# Patient Record
Sex: FEMALE | Race: WHITE | URBAN - METROPOLITAN AREA
[De-identification: names, ages, dates, MRNs, and addresses within clinical notes are randomized per-mention and may not be internally consistent; named-entity substitution may affect disease eponyms.]

---

## 2018-10-02 ENCOUNTER — HOSPITAL ENCOUNTER (OUTPATIENT)
Dept: MAMMOGRAPHY | Age: 38
Discharge: HOME | End: 2018-10-02
Attending: NURSE PRACTITIONER
Payer: COMMERCIAL

## 2018-10-02 DIAGNOSIS — Z12.31 VISIT FOR SCREENING MAMMOGRAM: ICD-10-CM

## 2018-10-02 PROCEDURE — 77067 SCR MAMMO BI INCL CAD: CPT

## 2019-03-12 ENCOUNTER — APPOINTMENT (RX ONLY)
Dept: URBAN - METROPOLITAN AREA CLINIC 349 | Facility: CLINIC | Age: 39
Setting detail: DERMATOLOGY
End: 2019-03-12

## 2019-03-12 DIAGNOSIS — L81.4 OTHER MELANIN HYPERPIGMENTATION: ICD-10-CM

## 2019-03-12 DIAGNOSIS — Z71.89 OTHER SPECIFIED COUNSELING: ICD-10-CM

## 2019-03-12 DIAGNOSIS — L21.8 OTHER SEBORRHEIC DERMATITIS: ICD-10-CM

## 2019-03-12 DIAGNOSIS — D22 MELANOCYTIC NEVI: ICD-10-CM

## 2019-03-12 DIAGNOSIS — Z80.8 FAMILY HISTORY OF MALIGNANT NEOPLASM OF OTHER ORGANS OR SYSTEMS: ICD-10-CM

## 2019-03-12 PROBLEM — D22.5 MELANOCYTIC NEVI OF TRUNK: Status: ACTIVE | Noted: 2019-03-12

## 2019-03-12 PROBLEM — J30.1 ALLERGIC RHINITIS DUE TO POLLEN: Status: ACTIVE | Noted: 2019-03-12

## 2019-03-12 PROBLEM — L29.8 OTHER PRURITUS: Status: ACTIVE | Noted: 2019-03-12

## 2019-03-12 PROCEDURE — ? COUNSELING

## 2019-03-12 PROCEDURE — ? PRESCRIPTION

## 2019-03-12 PROCEDURE — ? RECOMMENDATIONS

## 2019-03-12 PROCEDURE — 99203 OFFICE O/P NEW LOW 30 MIN: CPT

## 2019-03-12 RX ORDER — SELENIUM SULFIDE 23 MG/ML
SHAMPOO TOPICAL
Qty: 1 | Refills: 2 | Status: ERX | COMMUNITY
Start: 2019-03-12

## 2019-03-12 RX ORDER — CLOBETASOL PROPIONATE 0.46 MG/ML
SOLUTION TOPICAL
Qty: 1 | Refills: 2 | Status: ERX | COMMUNITY
Start: 2019-03-12

## 2019-03-12 RX ORDER — KETOCONAZOLE 20 MG/G
CREAM TOPICAL
Qty: 1 | Refills: 2 | Status: ERX | COMMUNITY
Start: 2019-03-12

## 2019-03-12 RX ADMIN — CLOBETASOL PROPIONATE: 0.46 SOLUTION TOPICAL at 00:00

## 2019-03-12 RX ADMIN — SELENIUM SULFIDE: 23 SHAMPOO TOPICAL at 00:00

## 2019-03-12 RX ADMIN — KETOCONAZOLE: 20 CREAM TOPICAL at 00:00

## 2019-03-12 ASSESSMENT — LOCATION ZONE DERM
LOCATION ZONE: ARM
LOCATION ZONE: LIP
LOCATION ZONE: SCALP
LOCATION ZONE: FACE
LOCATION ZONE: TRUNK

## 2019-03-12 ASSESSMENT — LOCATION DETAILED DESCRIPTION DERM
LOCATION DETAILED: UPPER STERNUM
LOCATION DETAILED: LEFT PROXIMAL DORSAL FOREARM
LOCATION DETAILED: LEFT LOWER CUTANEOUS LIP
LOCATION DETAILED: SUPERIOR THORACIC SPINE
LOCATION DETAILED: LEFT INFERIOR MEDIAL MALAR CHEEK
LOCATION DETAILED: LEFT ELBOW
LOCATION DETAILED: MID-OCCIPITAL SCALP
LOCATION DETAILED: LEFT SUPERIOR MEDIAL MIDBACK
LOCATION DETAILED: RIGHT MEDIAL MALAR CHEEK
LOCATION DETAILED: LEFT SUPERIOR UPPER BACK
LOCATION DETAILED: MIDDLE STERNUM

## 2019-03-12 ASSESSMENT — LOCATION SIMPLE DESCRIPTION DERM
LOCATION SIMPLE: LEFT FOREARM
LOCATION SIMPLE: LEFT UPPER BACK
LOCATION SIMPLE: CHEST
LOCATION SIMPLE: LEFT CHEEK
LOCATION SIMPLE: LEFT LIP
LOCATION SIMPLE: LEFT LOWER BACK
LOCATION SIMPLE: RIGHT CHEEK
LOCATION SIMPLE: LEFT ELBOW
LOCATION SIMPLE: POSTERIOR SCALP
LOCATION SIMPLE: UPPER BACK

## 2019-03-12 NOTE — HPI: SKIN LESIONS
How Severe Is Your Skin Lesion?: moderate
Have Your Skin Lesions Been Treated?: not been treated
Is This A New Presentation, Or A Follow-Up?: Skin Lesions
Which Family Member (Optional)?: Mother and grandmother

## 2019-03-12 NOTE — PROCEDURE: RECOMMENDATIONS
Detail Level: Detailed
Recommendations (Free Text): Selrx shampoo apply to scalp let sit 3 min then wash out everyday for a week then as needed\\nKetoconazole cream for face nightly for two weeks then as needed \\nClobetasol scalp solution nightly for two weeks then as needed

## 2019-09-10 PROBLEM — J45.909 ASTHMA: Status: ACTIVE | Noted: 2019-09-10

## 2019-09-10 PROBLEM — R35.0 INCREASED URINARY FREQUENCY: Status: ACTIVE | Noted: 2017-01-15

## 2019-09-11 PROBLEM — E66.01 SEVERE OBESITY (HCC): Status: ACTIVE | Noted: 2019-09-11

## 2020-03-12 ENCOUNTER — APPOINTMENT (RX ONLY)
Dept: URBAN - METROPOLITAN AREA CLINIC 349 | Facility: CLINIC | Age: 40
Setting detail: DERMATOLOGY
End: 2020-03-12

## 2020-03-12 DIAGNOSIS — D18.0 HEMANGIOMA: ICD-10-CM

## 2020-03-12 DIAGNOSIS — L81.4 OTHER MELANIN HYPERPIGMENTATION: ICD-10-CM

## 2020-03-12 DIAGNOSIS — L663 OTHER SPECIFIED DISEASES OF HAIR AND HAIR FOLLICLES: ICD-10-CM

## 2020-03-12 DIAGNOSIS — L73.9 FOLLICULAR DISORDER, UNSPECIFIED: ICD-10-CM

## 2020-03-12 DIAGNOSIS — D22 MELANOCYTIC NEVI: ICD-10-CM

## 2020-03-12 DIAGNOSIS — L738 OTHER SPECIFIED DISEASES OF HAIR AND HAIR FOLLICLES: ICD-10-CM

## 2020-03-12 DIAGNOSIS — L21.8 OTHER SEBORRHEIC DERMATITIS: ICD-10-CM | Status: STABLE

## 2020-03-12 DIAGNOSIS — L82.1 OTHER SEBORRHEIC KERATOSIS: ICD-10-CM

## 2020-03-12 PROBLEM — L02.32 FURUNCLE OF BUTTOCK: Status: ACTIVE | Noted: 2020-03-12

## 2020-03-12 PROBLEM — D22.62 MELANOCYTIC NEVI OF LEFT UPPER LIMB, INCLUDING SHOULDER: Status: ACTIVE | Noted: 2020-03-12

## 2020-03-12 PROBLEM — D22.39 MELANOCYTIC NEVI OF OTHER PARTS OF FACE: Status: ACTIVE | Noted: 2020-03-12

## 2020-03-12 PROBLEM — D18.01 HEMANGIOMA OF SKIN AND SUBCUTANEOUS TISSUE: Status: ACTIVE | Noted: 2020-03-12

## 2020-03-12 PROBLEM — D22.72 MELANOCYTIC NEVI OF LEFT LOWER LIMB, INCLUDING HIP: Status: ACTIVE | Noted: 2020-03-12

## 2020-03-12 PROBLEM — D22.5 MELANOCYTIC NEVI OF TRUNK: Status: ACTIVE | Noted: 2020-03-12

## 2020-03-12 PROCEDURE — ? RECOMMENDATIONS

## 2020-03-12 PROCEDURE — 99214 OFFICE O/P EST MOD 30 MIN: CPT

## 2020-03-12 PROCEDURE — ? COUNSELING

## 2020-03-12 PROCEDURE — ? BODY PHOTOGRAPHY

## 2020-03-12 PROCEDURE — ? PRESCRIPTION

## 2020-03-12 RX ORDER — SELENIUM SULFIDE 23 MG/ML
SHAMPOO TOPICAL
Qty: 1 | Refills: 2 | Status: ERX

## 2020-03-12 RX ORDER — CLINDAMYCIN PHOSPHATE 10 MG/G
GEL TOPICAL
Qty: 1 | Refills: 3 | Status: ERX | COMMUNITY
Start: 2020-03-12

## 2020-03-12 RX ORDER — CLOBETASOL PROPIONATE 0.46 MG/ML
SOLUTION TOPICAL
Qty: 1 | Refills: 2 | Status: ERX

## 2020-03-12 RX ADMIN — CLINDAMYCIN PHOSPHATE: 10 GEL TOPICAL at 00:00

## 2020-03-12 ASSESSMENT — LOCATION DETAILED DESCRIPTION DERM
LOCATION DETAILED: RIGHT POSTERIOR SHOULDER
LOCATION DETAILED: RIGHT POSTERIOR SHOULDER
LOCATION DETAILED: MIDDLE STERNUM
LOCATION DETAILED: LEFT INFERIOR MEDIAL FOREHEAD
LOCATION DETAILED: LEFT MEDIAL BUTTOCK
LOCATION DETAILED: LEFT SUPERIOR UPPER BACK
LOCATION DETAILED: LEFT PROXIMAL DORSAL FOREARM
LOCATION DETAILED: RIGHT INFERIOR MEDIAL MIDBACK
LOCATION DETAILED: RIGHT SUPERIOR UPPER BACK
LOCATION DETAILED: MID-OCCIPITAL SCALP
LOCATION DETAILED: LEFT PROXIMAL PRETIBIAL REGION
LOCATION DETAILED: LEFT ANTERIOR PROXIMAL UPPER ARM
LOCATION DETAILED: RIGHT MEDIAL MALAR CHEEK
LOCATION DETAILED: LEFT INFERIOR CENTRAL MALAR CHEEK
LOCATION DETAILED: LEFT SUPERIOR UPPER BACK
LOCATION DETAILED: RIGHT MEDIAL SUPERIOR CHEST
LOCATION DETAILED: UPPER STERNUM
LOCATION DETAILED: LEFT PROXIMAL DORSAL FOREARM
LOCATION DETAILED: LEFT BUTTOCK
LOCATION DETAILED: RIGHT MEDIAL SUPERIOR CHEST
LOCATION DETAILED: LEFT INFERIOR MEDIAL FOREHEAD
LOCATION DETAILED: LEFT ANTERIOR SHOULDER
LOCATION DETAILED: LEFT ANTERIOR DISTAL UPPER ARM
LOCATION DETAILED: LEFT LOWER CUTANEOUS LIP
LOCATION DETAILED: RIGHT SUPERIOR MEDIAL MIDBACK
LOCATION DETAILED: UPPER STERNUM
LOCATION DETAILED: LEFT INFERIOR MEDIAL MALAR CHEEK
LOCATION DETAILED: LEFT ANTERIOR SHOULDER
LOCATION DETAILED: RIGHT SUPERIOR MEDIAL MIDBACK
LOCATION DETAILED: LEFT PROXIMAL PRETIBIAL REGION
LOCATION DETAILED: PERIUMBILICAL SKIN
LOCATION DETAILED: MIDDLE STERNUM
LOCATION DETAILED: PERIUMBILICAL SKIN

## 2020-03-12 ASSESSMENT — LOCATION ZONE DERM
LOCATION ZONE: LEG
LOCATION ZONE: LIP
LOCATION ZONE: ARM
LOCATION ZONE: TRUNK
LOCATION ZONE: SCALP
LOCATION ZONE: LEG
LOCATION ZONE: FACE
LOCATION ZONE: TRUNK
LOCATION ZONE: ARM
LOCATION ZONE: FACE

## 2020-03-12 ASSESSMENT — LOCATION SIMPLE DESCRIPTION DERM
LOCATION SIMPLE: LEFT BUTTOCK
LOCATION SIMPLE: CHEST
LOCATION SIMPLE: ABDOMEN
LOCATION SIMPLE: LEFT FOREHEAD
LOCATION SIMPLE: LEFT LIP
LOCATION SIMPLE: RIGHT CHEEK
LOCATION SIMPLE: LEFT BUTTOCK
LOCATION SIMPLE: LEFT UPPER BACK
LOCATION SIMPLE: LEFT PRETIBIAL REGION
LOCATION SIMPLE: CHEST
LOCATION SIMPLE: RIGHT LOWER BACK
LOCATION SIMPLE: RIGHT SHOULDER
LOCATION SIMPLE: ABDOMEN
LOCATION SIMPLE: LEFT SHOULDER
LOCATION SIMPLE: LEFT UPPER BACK
LOCATION SIMPLE: LEFT UPPER ARM
LOCATION SIMPLE: LEFT CHEEK
LOCATION SIMPLE: LEFT FOREHEAD
LOCATION SIMPLE: RIGHT LOWER BACK
LOCATION SIMPLE: RIGHT UPPER BACK
LOCATION SIMPLE: LEFT UPPER ARM
LOCATION SIMPLE: LEFT PRETIBIAL REGION
LOCATION SIMPLE: LEFT FOREARM
LOCATION SIMPLE: LEFT FOREARM
LOCATION SIMPLE: LEFT SHOULDER
LOCATION SIMPLE: RIGHT SHOULDER
LOCATION SIMPLE: POSTERIOR SCALP

## 2020-03-12 NOTE — PROCEDURE: BODY PHOTOGRAPHY
Detail Level: Generalized
Consent: Written consent obtained, risks reviewed for whole body photography. Patient understands that photograph costs may not be covered by insurance, and patient is ultimately responsible for payment.
Was The Entire Body Photographed (Cannot Bill Unless Entire Body Photographed)?: No
Whole Body Statement: The whole body was photographed today.
Number Of Photographs (Optional- Will Not Render If 0): 5

## 2020-03-12 NOTE — PROCEDURE: RECOMMENDATIONS
Recommendations (Free Text): Selrx shampoo apply to scalp let sit 3 min then wash out everyday for a week then as needed\\nKetoconazole cream for face nightly for two weeks then as needed \\nClobetasol scalp solution nightly for two weeks then as needed
Detail Level: Detailed

## 2021-11-01 ENCOUNTER — APPOINTMENT (RX ONLY)
Dept: URBAN - METROPOLITAN AREA CLINIC 349 | Facility: CLINIC | Age: 41
Setting detail: DERMATOLOGY
End: 2021-11-01

## 2021-11-01 DIAGNOSIS — D22 MELANOCYTIC NEVI: ICD-10-CM

## 2021-11-01 DIAGNOSIS — L81.4 OTHER MELANIN HYPERPIGMENTATION: ICD-10-CM

## 2021-11-01 DIAGNOSIS — L82.1 OTHER SEBORRHEIC KERATOSIS: ICD-10-CM

## 2021-11-01 PROBLEM — D22.5 MELANOCYTIC NEVI OF TRUNK: Status: ACTIVE | Noted: 2021-11-01

## 2021-11-01 PROCEDURE — ? BODY PHOTOGRAPHY

## 2021-11-01 PROCEDURE — 99213 OFFICE O/P EST LOW 20 MIN: CPT

## 2021-11-01 PROCEDURE — ? COUNSELING

## 2021-11-01 ASSESSMENT — LOCATION SIMPLE DESCRIPTION DERM
LOCATION SIMPLE: CHEST
LOCATION SIMPLE: RIGHT UPPER BACK
LOCATION SIMPLE: UPPER BACK
LOCATION SIMPLE: LEFT FOREHEAD
LOCATION SIMPLE: LEFT FOREHEAD
LOCATION SIMPLE: UPPER BACK
LOCATION SIMPLE: RIGHT UPPER BACK
LOCATION SIMPLE: CHEST

## 2021-11-01 ASSESSMENT — LOCATION DETAILED DESCRIPTION DERM
LOCATION DETAILED: LEFT SUPERIOR FOREHEAD
LOCATION DETAILED: UPPER STERNUM
LOCATION DETAILED: RIGHT MEDIAL UPPER BACK
LOCATION DETAILED: SUPERIOR THORACIC SPINE
LOCATION DETAILED: SUPERIOR THORACIC SPINE
LOCATION DETAILED: LEFT SUPERIOR FOREHEAD
LOCATION DETAILED: UPPER STERNUM
LOCATION DETAILED: RIGHT SUPERIOR MEDIAL UPPER BACK

## 2021-11-01 ASSESSMENT — LOCATION ZONE DERM
LOCATION ZONE: FACE
LOCATION ZONE: TRUNK
LOCATION ZONE: TRUNK
LOCATION ZONE: FACE

## 2022-02-21 PROCEDURE — 88305 TISSUE EXAM BY PATHOLOGIST: CPT

## 2022-02-22 ENCOUNTER — HOSPITAL ENCOUNTER (OUTPATIENT)
Dept: LAB | Age: 42
Discharge: HOME | End: 2022-02-22

## 2022-03-19 PROBLEM — E66.01 SEVERE OBESITY (HCC): Status: ACTIVE | Noted: 2019-09-11

## 2022-06-13 ENCOUNTER — HOSPITAL ENCOUNTER (OUTPATIENT)
Dept: PHYSICAL THERAPY | Age: 42
Setting detail: RECURRING SERIES
Discharge: HOME | End: 2022-06-16
Payer: COMMERCIAL

## 2022-06-13 PROCEDURE — 97162 PT EVAL MOD COMPLEX 30 MIN: CPT

## 2022-06-13 PROCEDURE — 97530 THERAPEUTIC ACTIVITIES: CPT

## 2022-06-13 NOTE — PROGRESS NOTES
Larkin Landau  : 1980  Primary: Lulvu Michelle Cain  Secondary:  Maximus Waterman  [address] [facility] [address]  Phone: [phone]  Fax: [phone] Plan Frequency: 1x/week    Plan of Care/Certification Expiration Date: 22      PT Visit Info: Total # of Visits to Date: 1      OUTPATIENT PHYSICAL THERAPY:OP NOTE TYPE: Treatment Note 2022       Episode  }Appt Desk              Treatment Diagnosis:   Lack of coordination (muscle incoordination) (R27.8)  Mixed incontinence (Urge and stress incontinence) (N39.46)  Pelvic and perineal pain (R10.2)  Myalgia (M79.1)   Medical/Referring Diagnosis:  Pelvic and perineal pain [R10.2]  Referring Physician:  MAGGIE Muro MD Orders:  PT Eval and Treat   Date of Onset:  No data recorded   Allergies:   Patient has no allergy information on record. Restrictions/Precautions:  No data recordedNo data recorded   Interventions Planned (Treatment may consist of any combination of the following):    Current Treatment Recommendations: Strengthening; ROM; Neuromuscular re-education; Manual Therapy - Soft Tissue Mobilization; Pain management; Home exercise program     Subjective Comments:  GUEVARA>UUI, vaginal tearing  Initial:}     /10Post Session:        /10  Medications Last Reviewed:  2022  Updated Objective Findings:  See evaluation note from today  Treatment   THERAPEUTIC ACTIVITY: (25 minutes): Functional activity education regarding anatomy, pathology and role of pelvic floor muscle (PFM) function in relation to presenting symptoms and role of pelvic floor therapy in conservative treatment.  and Instruction on coordinated pelvic floor and diaphragmatic breathing to improve kinesthetic awareness of pelvic muscle mobility and restore proper motor recruitment patterns with breathing, posture, and functional movement (e.g. appropriate lift/contraction with increased IAP such as a cough, laugh, sneeze to prevent incontinence as well as appropriate relaxation/drop to reduce pain with vaginal penetration). TA Educational Topic Notes Date Completed   Pathology/Anatomy/PFM Function Completed 6/13/22   Bladder health education     Urinary urge suppression     The knack     Voiding strategies     Nocturia tips     Bowel/Bladder log     Bowel health education     Constipation care     Diarrhea/Fecal leakage     Colonic massage     Toilet positioning     Defecation dynamics     Sources of fiber     Return to intercourse/Dilator training     Sexual positioning     Lubricants/vaginal moisturizers     Vaginal irritants     Body mechanics     Posture/ergonomics     Diaphragmatic breathing Completed 6/13/22   Resources and technology         Treatment/Session Summary:    · Treatment Assessment:  Pt verbalized understanding of POC. She was prescribed a HEP. She was encouraged to reach out with any questions she may have. · Communication/Consultation:  None today  · Equipment provided today:  None  · Recommendations/Intent for next treatment session: Next visit will focus on MT to PFM (light pressure) coordination of PFM contraction.     Total Treatment Billable Duration:  60 minutes   Time In: 1000  Time Out: [email or web address], PT       Charge Capture  }Post Session Pain  PT Visit Info  [employer] Portal  MD Guidelines  Scanned Media  Benefits  MyChart    Future Appointments   Date Time Provider Teresa Miller   6/20/2022  3:00 PM Kelsey Newman [address] [place]   7/5/2022  9:00 AM Matthew jarrett PT [address] [place]   7/11/2022 10:00 AM Matthew jarrett PT [facility] [place]   7/18/2022 10:00 AM Matthew jarrett PT [facility] SFIFEANYI   7/25/2022 10:00 AM SCARLET Newman
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
## 2022-06-20 ENCOUNTER — HOSPITAL ENCOUNTER (OUTPATIENT)
Dept: PHYSICAL THERAPY | Age: 42
Setting detail: RECURRING SERIES
Discharge: HOME | End: 2022-06-23
Payer: COMMERCIAL

## 2022-06-20 PROCEDURE — 97140 MANUAL THERAPY 1/> REGIONS: CPT

## 2022-06-20 PROCEDURE — 97110 THERAPEUTIC EXERCISES: CPT

## 2022-06-20 NOTE — PROGRESS NOTES
Mariola Clark  : 1980  Primary: Bing Cain  Secondary:  Nolvia Vargas  [address] [place] [address]  Phone: [phone]  Fax: [phone] Plan Frequency: 1x/week    Plan of Care/Certification Expiration Date: 22      PT Visit Info: Total # of Visits to Date: 2      OUTPATIENT PHYSICAL THERAPY:OP NOTE TYPE: Treatment Note 2022       Episode  }Appt Desk              Treatment Diagnosis:   Lack of coordination (muscle incoordination) (R27.8)  Mixed incontinence (Urge and stress incontinence) (N39.46)  Pelvic and perineal pain (R10.2)  Myalgia (M79.1)   Medical/Referring Diagnosis:  Pelvic and perineal pain [R10.2]  Referring Physician:  MAGGIE Wagner MD Orders:  PT Eval and Treat   Date of Onset:  No data recorded   Allergies:   Patient has no allergy information on record. Restrictions/Precautions:  No data recordedNo data recorded   Interventions Planned (Treatment may consist of any combination of the following):    Current Treatment Recommendations: Strengthening; ROM; Neuromuscular re-education; Manual Therapy - Soft Tissue Mobilization; Pain management; Home exercise program     Subjective Comments:  Pt started her menses early. She denies pain following the evaluation. Initial:}     /10Post Session:        /10  Medications Last Reviewed:  2022  Updated Objective Findings:   Mild tenderness to palpation of superficial and deep PFM with light pressure utilized. PFM contraction graded as 2/5. Treatment   THERAPEUTIC ACTIVITY: (minutes): Functional activity education regarding anatomy, pathology and role of pelvic floor muscle (PFM) function in relation to presenting symptoms and role of pelvic floor therapy in conservative treatment.  and Instruction on coordinated pelvic floor and diaphragmatic breathing to improve kinesthetic awareness of pelvic muscle mobility and restore proper motor recruitment patterns with breathing, posture, and functional movement (e.g. appropriate lift/contraction with increased IAP such as a cough, laugh, sneeze to prevent incontinence as well as appropriate relaxation/drop to reduce pain with vaginal penetration). TA Educational Topic Notes Date Completed   Pathology/Anatomy/PFM Function Completed 6/13/22   Bladder health education     Urinary urge suppression     The knack     Voiding strategies     Nocturia tips     Bowel/Bladder log     Bowel health education     Constipation care     Diarrhea/Fecal leakage     Colonic massage     Toilet positioning     Defecation dynamics     Sources of fiber     Return to intercourse/Dilator training     Sexual positioning     Lubricants/vaginal moisturizers     Vaginal irritants     Body mechanics     Posture/ergonomics     Diaphragmatic breathing Completed 6/13/22   Resources and technology         THERAPEUTIC EXERCISE: (10 minutes):    Exercises per grid below to improve coordination. Required moderate verbal and manual cues to promote proper body mechanics and promote proper body breathing techniques. Progressed range and repetitions as indicated. Date:  6/20/22 Date:   Date:     Activity/Exercise Parameters Parameters Parameters   PF drops with digital cues X 10                    MANUAL THERAPY: (30 minutes): Soft tissue mobilization was utilized and necessary because of the patient's restricted motion of soft tissue.      Date Type Location Comments   6/20/2022 Internal assessment/treatment Via vaginal canal Light pressure, C/R, SP, STM     Proximal adductors through ischiorectal fossa STM                                 (Used abbreviations: MET - muscle energy technique; SCS- Strain counter strain; CTM-Connective tissue mobilizations; CR- Contract/Relax; SP- Sustained pressure; PIT- Positional inhibition techniques; STM Soft -tissue mobilization; MM- Myofascial mobilization; TrP-Trigger point release; IASTM- Instrument assisted soft tissue mobilizations, TDN-Trigger point dry needling)    Pt gives verbal consent to internal vaginal assessment/treatment without chaperon present. Treatment/Session Summary:    · Treatment Assessment:  Pt without report of vaginal tearing with palpation of PFM through manual therapy treatment today. Weakness present with performance of PFM contraction, likely related to overactivity. · Communication/Consultation:  None today  · Equipment provided today:  None  · Recommendations/Intent for next treatment session: Next visit will focus on MT to PFM (light to moderate  pressure) coordination of PFM contraction. Pt will be out of town next weekend on vacation.      Total Treatment Billable Duration:  40 minutes   Time In: 3370  Time Out: 0400    Keshawn Mendez, PT       Charge Capture  }Post Session Pain  PT Visit Info  [facility]  MD Guidelines  Scanned Media  Benefits  MyChart    Future Appointments   Date Time Provider Teresa Miller   7/5/2022  9:00 AM Keshawn Mendez, PT [facility]   7/11/2022 10:00 AM Keshawn Mendez PT [facility]   7/18/2022 10:00 AM Keshawn Mendez PT [facility]   7/25/2022 10:00 AM Keshawn Mendez, PT [facility]

## 2022-07-06 ENCOUNTER — HOSPITAL ENCOUNTER (OUTPATIENT)
Dept: PHYSICAL THERAPY | Age: 42
Setting detail: RECURRING SERIES
Discharge: HOME | End: 2022-07-09
Payer: COMMERCIAL

## 2022-07-06 PROCEDURE — 97110 THERAPEUTIC EXERCISES: CPT

## 2022-07-06 PROCEDURE — 97140 MANUAL THERAPY 1/> REGIONS: CPT

## 2022-07-06 NOTE — PROGRESS NOTES
Juan C Floor  : 1980  Primary: Romie Field Sc  Secondary:  Breanna Hi  [address] [facility] [address]  Phone: [phone]  Fax: [phone] Plan Frequency: 1x/week    Plan of Care/Certification Expiration Date: 22      PT Visit Info: Total # of Visits to Date: 3      OUTPATIENT PHYSICAL THERAPY:OP NOTE TYPE: Treatment Note 2022       Episode  }Appt Desk              Treatment Diagnosis:   Lack of coordination (muscle incoordination) (R27.8)  Mixed incontinence (Urge and stress incontinence) (N39.46)  Pelvic and perineal pain (R10.2)  Myalgia (M79.1)   Medical/Referring Diagnosis:  Pelvic and perineal pain [R10.2]  Referring Physician:  MAGGIE Navarro MD Orders:  PT Eval and Treat   Date of Onset:  No data recorded   Allergies:   Patient has no allergy information on record. Restrictions/Precautions:  No data recordedNo data recorded   Interventions Planned (Treatment may consist of any combination of the following):    Current Treatment Recommendations: Strengthening; ROM; Neuromuscular re-education; Manual Therapy - Soft Tissue Mobilization; Pain management; Home exercise program     Subjective Comments:  Vaginal tearing, GUEVARA>UUI, incomplete urinary evacuation  Pt attempted sexual intercourse last week and did experience tearing. Describes paper cut sensation with slight bleeding. No pain following internal treatment. Pt continues to have UI. This morning she stepped out of the bed and had leakage present. Initial:}     0/10Post Session:        /10  Medications Last Reviewed:  2022  Updated Objective Findings:   Tender to palpation at iliococcygeus (L>R), Pubococcygeus (L>R), STP (B), and DTP (B). PFM contraction graded as 2/5.   Posture: excessive lumbar lordosis, forward shoulders, hypertonic erector spinae    Treatment   THERAPEUTIC ACTIVITY: (minutes): Functional activity education regarding anatomy, pathology and role of pelvic floor muscle (PFM) function in relation to presenting symptoms and role of pelvic floor therapy in conservative treatment. and Instruction on coordinated pelvic floor and diaphragmatic breathing to improve kinesthetic awareness of pelvic muscle mobility and restore proper motor recruitment patterns with breathing, posture, and functional movement (e.g. appropriate lift/contraction with increased IAP such as a cough, laugh, sneeze to prevent incontinence as well as appropriate relaxation/drop to reduce pain with vaginal penetration). TA Educational Topic Notes Date Completed   Pathology/Anatomy/PFM Function Completed 6/13/22   Bladder health education     Urinary urge suppression     The knack     Voiding strategies     Nocturia tips     Bowel/Bladder log     Bowel health education     Constipation care     Diarrhea/Fecal leakage     Colonic massage     Toilet positioning     Defecation dynamics     Sources of fiber     Return to intercourse/Dilator training     Sexual positioning     Lubricants/vaginal moisturizers     Vaginal irritants     Body mechanics     Posture/ergonomics     Diaphragmatic breathing Completed 6/13/22   Resources and technology         THERAPEUTIC EXERCISE: (25 minutes):    Exercises per grid below to improve coordination. Required moderate verbal and manual cues to promote proper body mechanics and promote proper body breathing techniques. Progressed range and repetitions as indicated. Date:  6/20/22 Date:  7/6/22 Date:     Activity/Exercise Parameters Parameters Parameters   PF drops with digital cues X 10  Digital cues and with sEMG biofeedback    PF drop --> sub max contraction  With sEMG biofeedback x 10    Quick flicks 4 x 5 reps    Janina pose stretch  X 2 min    Single knee to chest  X 2 min    Piriformis stretch  X 2 min    HEP  Review POC      Access Code: [email or web address]  URL: [email or web address]  Date: 07/06/2022  Prepared by:  [facility]    Exercises  Child's Pose Stretch - 2 x daily - 7 x weekly - 3 sets - 30 hold  Supine Figure 4 Piriformis Stretch - 2 x daily - 7 x weekly - 3 sets - 30 hold  Supine Pelvic Floor Stretch - Hands on Knees - 1 x daily - 7 x weekly - 3 sets - 30 hold      MANUAL THERAPY: (30 minutes): Soft tissue mobilization was utilized and necessary because of the patient's restricted motion of soft tissue. Date Type Location Comments   7/6/2022 Internal assessment/treatment Via vaginal canal  moderate pressure, C/R, SP, STM, SCS     Proximal adductors through ischiorectal fossa - held today                                 (Used abbreviations: MET - muscle energy technique; SCS- Strain counter strain; CTM-Connective tissue mobilizations; CR- Contract/Relax; SP- Sustained pressure; PIT- Positional inhibition techniques; STM Soft -tissue mobilization; MM- Myofascial mobilization; TrP-Trigger point release; IASTM- Instrument assisted soft tissue mobilizations, TDN-Trigger point dry needling)    Pt gives verbal consent to internal vaginal assessment/treatment without chaperon present. Treatment/Session Summary:    · Treatment Assessment:  Pt with PFM overactivity present. Today, she reported increased pain with palpation of the levator ani muscles. Specifically, iliococcygeus (L>R) was tender to palpation. She did gain relief with S/CS. Therapist also observed restricted hip ER (L>R) and hypertrophied erector spine muscles. I reviewed importance of global flexibility to assist in PFM tension reduction.       · Communication/Consultation:  None today  · Equipment provided today:  None  · Recommendations/Intent for next treatment session: Next visit will focus on MT to PFM (light to moderate  pressure) coordination of PFM contraction, MT to erector spinae, review flexibility home program      Total Treatment Billable Duration:  55 minutes   Time In: 0305  Time Out: 0405    Chio Sarmiento PT       Charge Capture  }Post Session Pain  PT Visit Info  [facility]  MD Guidelines  Scanned Media  Benefits  MyChart    Future Appointments   Date Time Provider Teresa Angela   7/11/2022 10:00 AM Chio Sarmiento PT [facility]   7/18/2022 10:00 AM Chio Sarmiento PT [facility]   7/25/2022 10:00 AM Chio Sarmiento PT [facility] [place]

## 2022-07-11 ENCOUNTER — HOSPITAL ENCOUNTER (OUTPATIENT)
Dept: PHYSICAL THERAPY | Age: 42
Setting detail: RECURRING SERIES
End: 2022-07-11
Payer: COMMERCIAL

## 2022-07-11 NOTE — PROGRESS NOTES
Newton Cohen  : 1980  Primary: Doyle Cain  Secondary:  Jihan Gonzalez  [address] [facility] [address]  Phone: [phone]  Fax: [phone]    PT Visit Info: Total # of Visits to Date: 3     OT Visit Info:  No data recorded    OUTPATIENT THERAPY:OP NOTE TYPE: Progress Report 2022               Episode  Appt Desk           Newton Cohen cancelled her appointment for today due to unknown reasons. Will plan to follow up next during next appointment.   Thank you,  Matthew jarrett, PT    Future Appointments   Date Time Provider Teresa Miller   2022 10:00 AM Matthew jarrett PT [facility]   2022 10:00 AM Matthew jarrett PT [facility]   2022 10:00 AM Matthew jarrett PT [facility]   2022 10:00 [facility]

## 2022-07-18 ENCOUNTER — HOSPITAL ENCOUNTER (OUTPATIENT)
Dept: PHYSICAL THERAPY | Age: 42
Setting detail: RECURRING SERIES
End: 2022-07-18
Payer: COMMERCIAL

## 2022-07-18 NOTE — PROGRESS NOTES
Aminah Tapia  : 1980  Primary: Elder Cain  Secondary:  Mike Fix  [address] [facility] [address]  Phone: [phone]  Fax: [phone]    PT Visit Info: Total # of Visits to Date: 3     OT Visit Info:  No data recorded    OUTPATIENT THERAPY:OP NOTE TYPE: Progress Report 2022               Episode  Appt Desk           Aminah Tapia cancelled her appointment for today due to  out of town . Will plan to follow up next during next appointment.   Thank you,  Chio Sarmiento, SCARLET    Future Appointments   Date Time Provider Teresa Miller   2022 10:00 AM Kelsey Mallory [facility]   2022 10:00 AM Chio Sarmiento PT [facility]

## 2022-07-25 ENCOUNTER — APPOINTMENT (OUTPATIENT)
Dept: PHYSICAL THERAPY | Age: 42
End: 2022-07-25
Payer: COMMERCIAL

## 2022-08-01 ENCOUNTER — HOSPITAL ENCOUNTER (OUTPATIENT)
Dept: PHYSICAL THERAPY | Age: 42
Setting detail: RECURRING SERIES
Discharge: HOME | End: 2022-08-04
Payer: COMMERCIAL

## 2022-08-01 PROCEDURE — 97110 THERAPEUTIC EXERCISES: CPT

## 2022-08-01 PROCEDURE — 97140 MANUAL THERAPY 1/> REGIONS: CPT

## 2022-08-15 ENCOUNTER — HOSPITAL ENCOUNTER (OUTPATIENT)
Dept: PHYSICAL THERAPY | Age: 42
Setting detail: RECURRING SERIES
End: 2022-08-15
Payer: COMMERCIAL

## 2022-08-15 NOTE — PROGRESS NOTES
Elliott Murry  : 1980  Primary: Rashida Cain  Secondary:  Moses Enrique  [address] [facility] [address]  Phone: [phone]  Fax: [phone]    PT Visit Info: Total # of Visits to Date: 4     OT Visit Info:  No data recorded    OUTPATIENT THERAPY:OP NOTE TYPE: Progress Report 8/15/2022               Episode  Appt Desk           Elliott Murry cancelled her appointment for today due to unknown reasons. Will plan to follow up next during next appointment.   Thank you,  Sonia Glover PT    Future Appointments   Date Time Provider Teresa Miller   8/15/2022  2:00 PM Sonia Glover [facility]   2022  3:00 PM Sonia Glover [address] [facility]   2022  9:00 AM [facility]

## 2022-08-22 ENCOUNTER — HOSPITAL ENCOUNTER (OUTPATIENT)
Dept: LAB | Age: 42
Discharge: HOME | End: 2022-08-25

## 2022-08-22 ENCOUNTER — HOSPITAL ENCOUNTER (OUTPATIENT)
Dept: PHYSICAL THERAPY | Age: 42
Setting detail: RECURRING SERIES
Discharge: HOME | End: 2022-08-25
Payer: COMMERCIAL

## 2022-08-22 PROCEDURE — 88305 TISSUE EXAM BY PATHOLOGIST: CPT

## 2022-08-22 PROCEDURE — 97110 THERAPEUTIC EXERCISES: CPT

## 2022-08-22 NOTE — PROGRESS NOTES
Tabitha Mcfarland  : 1980  Primary: Sarthak Chao Sc  Secondary:  Richardine Severs  [address] [facility] [address]  Phone: [phone]  Fax: [phone] Plan Frequency: 1x/week    Plan of Care/Certification Expiration Date: 22      PT Visit Info: Total # of Visits to Date: 5      OUTPATIENT PHYSICAL THERAPY:OP NOTE TYPE: Treatment Note 2022       Episode  }Appt Desk              Treatment Diagnosis:   Lack of coordination (muscle incoordination) (R27.8)  Mixed incontinence (Urge and stress incontinence) (N39.46)  Pelvic and perineal pain (R10.2)  Myalgia (M79.1)   Medical/Referring Diagnosis:  Pelvic and perineal pain [R10.2]  Referring Physician:  MAGGIE Coates MD Orders:  PT Eval and Treat   Date of Onset:  No data recorded   Allergies:   Patient has no allergy information on record. Restrictions/Precautions:  No data recordedNo data recorded   Interventions Planned (Treatment may consist of any combination of the following):    Current Treatment Recommendations: Strengthening; ROM; Neuromuscular re-education; Manual Therapy - Soft Tissue Mobilization; Pain management; Home exercise program     Subjective Comments:  Pt reports reduction in UI. Pt has not noticed vaginal tearing. She had a colonoscopy this morning and defers internal treatment. Initial:}     0/10Post Session:        0/10  Medications Last Reviewed:  2022  Updated Objective Findings:     Treatment   THERAPEUTIC ACTIVITY: (5 minutes): Functional activity education regarding anatomy, pathology and role of pelvic floor muscle (PFM) function in relation to presenting symptoms and role of pelvic floor therapy in conservative treatment.  and Instruction on coordinated pelvic floor and diaphragmatic breathing to improve kinesthetic awareness of pelvic muscle mobility and restore proper motor recruitment patterns with breathing, posture, and functional movement (e.g. appropriate lift/contraction with increased IAP such as a cough, laugh, sneeze to prevent incontinence as well as appropriate relaxation/drop to reduce pain with vaginal penetration). TA Educational Topic Notes Date Completed   Pathology/Anatomy/PFM Function Completed 6/13/22   Bladder health education     Urinary urge suppression     The knack     Voiding strategies     Nocturia tips     Bowel/Bladder log     Bowel health education     Constipation care     Diarrhea/Fecal leakage     Colonic massage     Toilet positioning     Defecation dynamics     Sources of fiber     Return to intercourse/Dilator training     Sexual positioning     Lubricants/vaginal moisturizers     Vaginal irritants     Body mechanics     Posture/ergonomics reviewed 8/1/22   Diaphragmatic breathing Completed 6/13/22   Resources and technology         THERAPEUTIC EXERCISE: (35 minutes):    Exercises per grid below to improve coordination. Required moderate verbal and manual cues to promote proper body mechanics and promote proper body breathing techniques. Progressed range and repetitions as indicated. Date:  6/20/22 Date:  7/6/22 Date:  8/1/22 Date:  8/22/22   Activity/Exercise Parameters Parameters Parameters    PF drops with digital cues X 10  Digital cues and with sEMG biofeedback Digital cues    PF drop --> sub max contraction  With sEMG biofeedback x 10    Quick flicks 4 x 5 reps Digital cues    Janina pose stretch  X 2 min  Cat/cow>janina pose   Single knee to chest  X 2 min     Piriformis stretch  X 2 min  Seated, bal roll out and sustained stretch   HEP  Review POC Review POC Review of home flexibility program   Hamstring stretch    3 x 30 sec, assisted with strap   Pelvic tilts    Posterior pelvic tilts in supine   Supine hip abduction    Resisted with blue TB   Bridge    X 10     Access Code: [email or web address]  URL: [email or web address]  Date: 07/06/2022  Prepared by:  [facility]    Exercises  Child's Pose Stretch - 2 x daily - 7 x weekly - 3 sets - 30 hold  Supine Figure 4 Piriformis Stretch - 2 x daily - 7 x weekly - 3 sets - 30 hold  Supine Pelvic Floor Stretch - Hands on Knees - 1 x daily - 7 x weekly - 3 sets - 30 hold      MANUAL THERAPY: (30 minutes): Soft tissue mobilization was utilized and necessary because of the patient's restricted motion of soft tissue. Date Type Location Comments   8/22/2022 Internal assessment/treatment Via vaginal canal  moderate pressure, C/R, SP, STM, SCS     Proximal adductors through ischiorectal fossa                                  (Used abbreviations: MET - muscle energy technique; SCS- Strain counter strain; CTM-Connective tissue mobilizations; CR- Contract/Relax; SP- Sustained pressure; PIT- Positional inhibition techniques; STM Soft -tissue mobilization; MM- Myofascial mobilization; TrP-Trigger point release; IASTM- Instrument assisted soft tissue mobilizations, TDN-Trigger point dry needling)    Pt gives verbal consent to internal vaginal assessment/treatment without chaperon present. Treatment/Session Summary:    Treatment Assessment: Therapist held on internal treatment today due to having colonscopy procedure today. Emphasis of today's visit was to review a global home flexibility program to assist tension reduction.       Communication/Consultation:  None today  Equipment provided today:  None  Recommendations/Intent for next treatment session: Next visit will focus on MT to PFM (moderate pressure) coordination of PFM contraction, MT to erector spinae, review flexibility home program      Total Treatment Billable Duration:  35 minutes   Time In: 0315  Time Out: [address] [facility], PT       Charge Capture  }Post Session Pain  PT Visit [address] [facility] Portal  MD [address]    Future Appointments   Date Time Provider Teresa Miller   8/22/2022  4:30 PM [facility] OUTREACH CLIENT [facility]   8/29/2022  9:00 AM Buck Gavin, PT [facility]

## 2022-08-29 ENCOUNTER — HOSPITAL ENCOUNTER (OUTPATIENT)
Dept: PHYSICAL THERAPY | Age: 42
Setting detail: RECURRING SERIES
Discharge: HOME | End: 2022-09-01
Payer: COMMERCIAL

## 2022-08-29 PROCEDURE — 97530 THERAPEUTIC ACTIVITIES: CPT

## 2022-08-29 PROCEDURE — 97140 MANUAL THERAPY 1/> REGIONS: CPT

## 2022-08-29 NOTE — PROGRESS NOTES
Newton Noel  : 1980  Primary: Hollie Cain  Secondary:  Jihan [place] [address]  Phone: [phone]  Fax: [phone] Plan Frequency: 1x/week    Plan of Care/Certification Expiration Date: 22      PT Visit Info: Total # of Visits to Date: 6      OUTPATIENT PHYSICAL THERAPY:OP NOTE TYPE: Treatment Note 2022       Episode  }Appt Desk              Treatment Diagnosis:   Lack of coordination (muscle incoordination) (R27.8)  Mixed incontinence (Urge and stress incontinence) (N39.46)  Pelvic and perineal pain (R10.2)  Myalgia (M79.1)   Medical/Referring Diagnosis:  Pelvic and perineal pain [R10.2]  Referring Physician:  MAGGIE Gonzalez MD Orders:  PT Eval and Treat   Date of Onset:  No data recorded   Allergies:   Patient has no allergy information on record. Restrictions/Precautions:  No data recordedNo data recorded   Interventions Planned (Treatment may consist of any combination of the following):    Current Treatment Recommendations: Strengthening; ROM; Neuromuscular re-education; Manual Therapy - Soft Tissue Mobilization; Pain management; Home exercise program     Subjective Comments:  Pt just finishing her menses. Feels this is more regular. Pt denies noticeable vaginal tearing. Pt reports UI has improved. Initial:}     0/10Post Session:        0/10  Medications Last Reviewed:  2022  Updated Objective Findings:   Tender to palpation through levator ani muscles, bilaterally. STP tender to palpation, bilaterally with report of feeling she may tear. PFM contraction: 2/5    Treatment   THERAPEUTIC ACTIVITY: (10 minutes): Functional activity education regarding anatomy, pathology and role of pelvic floor muscle (PFM) function in relation to presenting symptoms and role of pelvic floor therapy in conservative treatment.  and Instruction on coordinated pelvic floor and diaphragmatic breathing to improve kinesthetic awareness of pelvic muscle mobility and restore proper motor recruitment patterns with breathing, posture, and functional movement (e.g. appropriate lift/contraction with increased IAP such as a cough, laugh, sneeze to prevent incontinence as well as appropriate relaxation/drop to reduce pain with vaginal penetration). TA Educational Topic Notes Date Completed   Pathology/Anatomy/PFM Function Completed 6/13/22   Bladder health education     Urinary urge suppression     The knack     Voiding strategies     Nocturia tips     Bowel/Bladder log     Bowel health education     Constipation care     Diarrhea/Fecal leakage     Colonic massage     Toilet positioning     Defecation dynamics     Sources of fiber     Return to intercourse/Dilator training     Sexual positioning     Lubricants/vaginal moisturizers V magic - superficial perineal massage  8/29/22   Vaginal irritants     Body mechanics     Posture/ergonomics reviewed 8/1/22   Diaphragmatic breathing Completed  Reviewed 6/13/22 8/29/22   Resources and technology         THERAPEUTIC EXERCISE: ( minutes):    Exercises per grid below to improve coordination. Required moderate verbal and manual cues to promote proper body mechanics and promote proper body breathing techniques. Progressed range and repetitions as indicated.      Date:  6/20/22 Date:  7/6/22 Date:  8/1/22 Date:  8/22/22 Date:  8/29/22   Activity/Exercise Parameters Parameters Parameters Parameters Parameters   PF drops with digital cues X 10  Digital cues and with sEMG biofeedback Digital cues     PF drop --> sub max contraction  With sEMG biofeedback x 10    Quick flicks 4 x 5 reps Digital cues     Janina pose stretch  X 2 min  Cat/cow>janina pose    Single knee to chest  X 2 min      Piriformis stretch  X 2 min  Seated, bal roll out and sustained stretch    HEP  Review POC Review POC Review of home flexibility program    Hamstring stretch    3 x 30 sec, assisted with strap    Pelvic tilts    Posterior pelvic tilts in supine    Supine hip abduction    Resisted with blue TB    Bridge    X 10      Access Code: [email or web address]  URL: [email or web address]  Date: 07/06/2022  Prepared by: Terry Triplett    Exercises  Child's Pose Stretch - 2 x daily - 7 x weekly - 3 sets - 30 hold  Supine Figure 4 Piriformis Stretch - 2 x daily - 7 x weekly - 3 sets - 30 hold  Supine Pelvic Floor Stretch - Hands on Knees - 1 x daily - 7 x weekly - 3 sets - 30 hold      MANUAL THERAPY: (30 minutes): Soft tissue mobilization was utilized and necessary because of the patient's restricted motion of soft tissue. Date Type Location Comments   8/29/2022 Internal assessment/treatment Via vaginal canal  moderate pressure, C/R, SP, STM, SCS     Proximal adductors through ischiorectal fossa                                  (Used abbreviations: MET - muscle energy technique; SCS- Strain counter strain; CTM-Connective tissue mobilizations; CR- Contract/Relax; SP- Sustained pressure; PIT- Positional inhibition techniques; STM Soft -tissue mobilization; MM- Myofascial mobilization; TrP-Trigger point release; IASTM- Instrument assisted soft tissue mobilizations, TDN-Trigger point dry needling)    Pt gives verbal consent to internal vaginal assessment/treatment without chaperon present. Treatment/Session Summary:    Treatment Assessment: Pt continues to present with PFM overactivity present. Tenderness to palpation through LA muscles and STP, bilaterally. She has notable muscle guarding present with palpation of her PFM, requiring verbal cues to relax and lengthen through diaphragmatic breathing.       Communication/Consultation:  None today  Equipment provided today:  None  Recommendations/Intent for next treatment session: Next visit will focus on MT to PFM (moderate pressure) coordination of PFM contraction, continue flexibility home program      Total Treatment Billable Duration:  40  minutes   Time In: 0920  Time Out: [address], PT       Charge Capture  }Post Session Pain  PT Visit Info  [employer] Portal  MD Guidelines  Scanned Media  Benefits  MyChart    No future appointments.

## 2022-09-12 ENCOUNTER — HOSPITAL ENCOUNTER (OUTPATIENT)
Dept: PHYSICAL THERAPY | Age: 42
Setting detail: RECURRING SERIES
Discharge: HOME | End: 2022-09-15
Payer: COMMERCIAL

## 2022-09-12 PROCEDURE — 97530 THERAPEUTIC ACTIVITIES: CPT

## 2022-09-12 PROCEDURE — 97140 MANUAL THERAPY 1/> REGIONS: CPT

## 2022-09-12 PROCEDURE — 97110 THERAPEUTIC EXERCISES: CPT
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
## 2022-09-12 NOTE — PROGRESS NOTES
Reviewed:  9/12/2022  Updated Objective Findings:   Tender to palpation through levator ani muscles, bilaterally. STP tender to palpation, bilaterally with report of feeling she may tear. (Did not complete internal assessment 9/12/22)  PFM contraction: 2/5 (Did not complete internal assessment 9/12/22)    9/12/22  sEMG biofeedback: resting PFM activity <3.0 mV. PFM activation to 20 mV. Treatment   THERAPEUTIC ACTIVITY: (15 minutes): Functional activity education regarding anatomy, pathology and role of pelvic floor muscle (PFM) function in relation to presenting symptoms and role of pelvic floor therapy in conservative treatment. and Instruction on coordinated pelvic floor and diaphragmatic breathing to improve kinesthetic awareness of pelvic muscle mobility and restore proper motor recruitment patterns with breathing, posture, and functional movement (e.g. appropriate lift/contraction with increased IAP such as a cough, laugh, sneeze to prevent incontinence as well as appropriate relaxation/drop to reduce pain with vaginal penetration). TA Educational Topic Notes Date Completed   Pathology/Anatomy/PFM Function Completed 6/13/22   Bladder health education     Urinary urge suppression     The knack     Voiding strategies     Nocturia tips     Bowel/Bladder log     Bowel health education     Constipation care     Diarrhea/Fecal leakage     Colonic massage     Toilet positioning     Defecation dynamics     Sources of fiber     Return to intercourse/Dilator training     Sexual positioning     Lubricants/vaginal moisturizers V magic - superficial perineal massage  reviewed 8/29/22 9/12/22   Vaginal irritants     Body mechanics     Posture/ergonomics reviewed 8/1/22   Diaphragmatic breathing Completed  Reviewed  Reviewed 6/13/22 8/29/22 9/12/22   Resources and technology         THERAPEUTIC EXERCISE: (25 minutes):    Exercises per grid below to improve coordination.   Required moderate verbal and manual cues to promote proper body mechanics and promote proper body breathing techniques. Progressed range and repetitions as indicated. Date:  6/20/22 Date:  7/6/22 Date:  8/1/22 Date:  8/22/22 Date:  8/29/22   Activity/Exercise Parameters Parameters Parameters Parameters Parameters   PF drops with digital cues X 10  Digital cues and with sEMG biofeedback Digital cues  With sEMG biofeedback, not digital cues    *quick flicks in sets of 3 x 6    PF drop --> sub max contraction  With sEMG biofeedback x 10    Quick flicks 4 x 5 reps Digital cues  With sEMG biofeedback   Janina pose stretch  X 2 min  Cat/cow>janina pose    Single knee to chest  X 2 min      Piriformis stretch  X 2 min  Seated, bal roll out and sustained stretch    HEP  Review POC Review POC Review of home flexibility program    Hamstring stretch    3 x 30 sec, assisted with strap    Pelvic tilts    Posterior pelvic tilts in supine    Supine hip abduction    Resisted with blue TB    Bridge    X 10      Access Code: [email or web address]  URL: [email or web address] FUTURE VISION [email or web address]/  Date: 07/06/2022  Prepared by: [facility]    Exercises  Child's Pose Stretch - 2 x daily - 7 x weekly - 3 sets - 30 hold  Supine Figure 4 Piriformis Stretch - 2 x daily - 7 x weekly - 3 sets - 30 hold  Supine Pelvic Floor Stretch - Hands on Knees - 1 x daily - 7 x weekly - 3 sets - 30 hold      MANUAL THERAPY: (10 minutes): Soft tissue mobilization was utilized and necessary because of the patient's restricted motion of soft tissue.      Date Type Location Comments   9/12/2022 Internal assessment/treatment Via vaginal canal  moderate pressure, C/R, SP, STM, SCS - held today due to UTI     Proximal adductors through ischiorectal fossa                                  (Used abbreviations: MET - muscle energy technique; SCS- Strain counter strain; CTM-Connective tissue mobilizations; CR- Contract/Relax; SP- Sustained pressure; PIT- Positional inhibition techniques; STM Soft -tissue mobilization; MM- Myofascial mobilization; TrP-Trigger point release; IASTM- Instrument assisted soft tissue mobilizations, TDN-Trigger point dry needling)    Pt gives verbal consent to internal vaginal assessment/treatment without chaperon present. Treatment/Session Summary:    Treatment Assessment: See Re-certification. Communication/Consultation:  Recommended follow-up with Dr. Donell Courtney due to vaginal tearing.    Equipment provided today:  None  Recommendations/Intent for next treatment session: Next visit will focus on MT to PFM (mild pressure), sub max PFM contractions in supine, seated, standing      Total Treatment Billable Duration:  50  Time In: 0910  Time Out: 1000    Stefan Head PT       Charge Capture  }Post Session Pain  PT Visit Info  [facility] Portal  MD Guidelines  Scanned Media  Benefits  MyChart    Future Appointments   Date Time Provider Teresa Miller   9/20/2022  9:00 AM Stefan Head PT [facility]

## 2022-11-07 ENCOUNTER — APPOINTMENT (RX ONLY)
Dept: URBAN - METROPOLITAN AREA CLINIC 329 | Facility: CLINIC | Age: 42
Setting detail: DERMATOLOGY
End: 2022-11-07

## 2022-11-07 DIAGNOSIS — D18.0 HEMANGIOMA: ICD-10-CM

## 2022-11-07 DIAGNOSIS — L82.1 OTHER SEBORRHEIC KERATOSIS: ICD-10-CM

## 2022-11-07 DIAGNOSIS — L81.4 OTHER MELANIN HYPERPIGMENTATION: ICD-10-CM

## 2022-11-07 DIAGNOSIS — D22 MELANOCYTIC NEVI: ICD-10-CM

## 2022-11-07 DIAGNOSIS — L21.8 OTHER SEBORRHEIC DERMATITIS: ICD-10-CM | Status: IMPROVED

## 2022-11-07 PROBLEM — D22.5 MELANOCYTIC NEVI OF TRUNK: Status: ACTIVE | Noted: 2022-11-07

## 2022-11-07 PROBLEM — D18.01 HEMANGIOMA OF SKIN AND SUBCUTANEOUS TISSUE: Status: ACTIVE | Noted: 2022-11-07

## 2022-11-07 PROCEDURE — ? PRESCRIPTION

## 2022-11-07 PROCEDURE — ? COUNSELING

## 2022-11-07 PROCEDURE — ? PRESCRIPTION MEDICATION MANAGEMENT

## 2022-11-07 PROCEDURE — 99214 OFFICE O/P EST MOD 30 MIN: CPT

## 2022-11-07 PROCEDURE — ? TREATMENT REGIMEN

## 2022-11-07 RX ORDER — SELENIUM SULFIDE 23 MG/ML
SHAMPOO TOPICAL
Qty: 180 | Refills: 5 | Status: ERX | COMMUNITY
Start: 2022-11-07

## 2022-11-07 RX ORDER — CLOBETASOL PROPIONATE 0.5 MG/ML
SOLUTION TOPICAL
Qty: 50 | Refills: 2 | Status: ERX | COMMUNITY
Start: 2022-11-07

## 2022-11-07 RX ADMIN — CLOBETASOL PROPIONATE: 0.5 SOLUTION TOPICAL at 00:00

## 2022-11-07 RX ADMIN — SELENIUM SULFIDE: 23 SHAMPOO TOPICAL at 00:00

## 2022-11-07 ASSESSMENT — LOCATION DETAILED DESCRIPTION DERM
LOCATION DETAILED: LEFT INFERIOR MEDIAL MALAR CHEEK
LOCATION DETAILED: MID-OCCIPITAL SCALP
LOCATION DETAILED: RIGHT ANTERIOR MEDIAL DISTAL UPPER ARM
LOCATION DETAILED: RIGHT SUPERIOR UPPER BACK
LOCATION DETAILED: SUPERIOR THORACIC SPINE
LOCATION DETAILED: LEFT LOWER CUTANEOUS LIP
LOCATION DETAILED: RIGHT MEDIAL MALAR CHEEK
LOCATION DETAILED: RIGHT MEDIAL BREAST 1-2:00 REGION

## 2022-11-07 ASSESSMENT — LOCATION SIMPLE DESCRIPTION DERM
LOCATION SIMPLE: LEFT CHEEK
LOCATION SIMPLE: RIGHT UPPER BACK
LOCATION SIMPLE: UPPER BACK
LOCATION SIMPLE: RIGHT CHEEK
LOCATION SIMPLE: RIGHT UPPER ARM
LOCATION SIMPLE: POSTERIOR SCALP
LOCATION SIMPLE: RIGHT BREAST
LOCATION SIMPLE: LEFT LIP

## 2022-11-07 ASSESSMENT — LOCATION ZONE DERM
LOCATION ZONE: ARM
LOCATION ZONE: LIP
LOCATION ZONE: TRUNK
LOCATION ZONE: SCALP
LOCATION ZONE: FACE

## 2022-11-07 NOTE — PROCEDURE: PRESCRIPTION MEDICATION MANAGEMENT
Detail Level: Zone
Render In Strict Bullet Format?: No
Continue Regimen: Selrx shampoo\\nClobetasol scalp solution nightly

## 2022-11-16 ENCOUNTER — HOSPITAL ENCOUNTER (OUTPATIENT)
Dept: MAMMOGRAPHY | Age: 42
Discharge: HOME | End: 2022-11-19

## 2022-11-16 DIAGNOSIS — Z12.31 VISIT FOR SCREENING MAMMOGRAM: ICD-10-CM

## 2022-11-16 PROCEDURE — 77067 SCR MAMMO BI INCL CAD: CPT

## 2023-01-12 ENCOUNTER — CLINICAL DOCUMENTATION (OUTPATIENT)
Dept: PHYSICAL THERAPY | Age: 43
End: 2023-01-12

## 2023-01-12 NOTE — THERAPY DISCHARGE
[facility] [address] [address]  Phone: [phone]  Fax: [phone]    OUTPATIENT PHYSICAL THERAPY  Discharge Summary 1/12/2023  Episode  Appt Desk         Aparna Noel has been seen in physical therapy from 6/13/22 to 9/12/22, with 4 cancellations and 2 no shows. Ms. eHlene Perez therapy has come to an end at this time due to: Patient did not return for/schedule additional treatment    Physical Therapy Goals:  Not Met: Reasons for goals not being achieved: Pt did not return for f/u care and did not complete POC.     Matthew jarrett, PT

## 2023-09-25 ENCOUNTER — TRANSCRIBE ORDERS (OUTPATIENT)
Dept: SCHEDULING | Age: 43
End: 2023-09-25

## 2023-09-25 DIAGNOSIS — R10.12 LEFT UPPER QUADRANT PAIN: ICD-10-CM

## 2023-09-25 DIAGNOSIS — R53.83 FATIGUE, UNSPECIFIED TYPE: ICD-10-CM

## 2023-09-25 DIAGNOSIS — R10.32 LEFT LOWER QUADRANT PAIN: Primary | ICD-10-CM

## 2023-11-07 ENCOUNTER — APPOINTMENT (RX ONLY)
Dept: URBAN - METROPOLITAN AREA CLINIC 329 | Facility: CLINIC | Age: 43
Setting detail: DERMATOLOGY
End: 2023-11-07

## 2023-11-07 DIAGNOSIS — L81.4 OTHER MELANIN HYPERPIGMENTATION: ICD-10-CM

## 2023-11-07 DIAGNOSIS — D22 MELANOCYTIC NEVI: ICD-10-CM

## 2023-11-07 DIAGNOSIS — D18.0 HEMANGIOMA: ICD-10-CM

## 2023-11-07 DIAGNOSIS — L30.4 ERYTHEMA INTERTRIGO: ICD-10-CM | Status: INADEQUATELY CONTROLLED

## 2023-11-07 DIAGNOSIS — L85.3 XEROSIS CUTIS: ICD-10-CM

## 2023-11-07 DIAGNOSIS — L82.1 OTHER SEBORRHEIC KERATOSIS: ICD-10-CM

## 2023-11-07 PROBLEM — D18.01 HEMANGIOMA OF SKIN AND SUBCUTANEOUS TISSUE: Status: ACTIVE | Noted: 2023-11-07

## 2023-11-07 PROBLEM — D22.5 MELANOCYTIC NEVI OF TRUNK: Status: ACTIVE | Noted: 2023-11-07

## 2023-11-07 PROCEDURE — ? PRESCRIPTION MEDICATION MANAGEMENT

## 2023-11-07 PROCEDURE — 99213 OFFICE O/P EST LOW 20 MIN: CPT

## 2023-11-07 PROCEDURE — ? COUNSELING

## 2023-11-07 PROCEDURE — ? SUNSCREEN RECOMMENDATIONS

## 2023-11-07 PROCEDURE — ? PRESCRIPTION

## 2023-11-07 RX ORDER — KETOCONAZOLE 20 MG/G
CREAM TOPICAL
Qty: 30 | Refills: 2 | Status: ERX | COMMUNITY
Start: 2023-11-07

## 2023-11-07 RX ADMIN — KETOCONAZOLE: 20 CREAM TOPICAL at 00:00

## 2023-11-07 ASSESSMENT — LOCATION SIMPLE DESCRIPTION DERM
LOCATION SIMPLE: RIGHT UPPER BACK
LOCATION SIMPLE: ABDOMEN
LOCATION SIMPLE: UPPER BACK
LOCATION SIMPLE: LEFT THIGH
LOCATION SIMPLE: RIGHT BREAST

## 2023-11-07 ASSESSMENT — LOCATION DETAILED DESCRIPTION DERM
LOCATION DETAILED: RIGHT SUPERIOR UPPER BACK
LOCATION DETAILED: SUPERIOR THORACIC SPINE
LOCATION DETAILED: LEFT ANTERIOR PROXIMAL THIGH
LOCATION DETAILED: EPIGASTRIC SKIN
LOCATION DETAILED: RIGHT SUPERIOR MEDIAL UPPER BACK
LOCATION DETAILED: INFERIOR THORACIC SPINE
LOCATION DETAILED: RIGHT MEDIAL BREAST 1-2:00 REGION

## 2023-11-07 ASSESSMENT — LOCATION ZONE DERM
LOCATION ZONE: LEG
LOCATION ZONE: TRUNK

## 2023-11-07 NOTE — PROCEDURE: COUNSELING
Detail Level: Generalized
Patient Specific Counseling (Will Not Stick From Patient To Patient): Cerave moisturizer or Lipikar AP Balm
Detail Level: Detailed
Detail Level: Simple

## 2024-02-09 ENCOUNTER — APPOINTMENT (RX ONLY)
Dept: URBAN - METROPOLITAN AREA CLINIC 329 | Facility: CLINIC | Age: 44
Setting detail: DERMATOLOGY
End: 2024-02-09

## 2024-02-09 DIAGNOSIS — L71.8 OTHER ROSACEA: ICD-10-CM

## 2024-02-09 PROBLEM — L30.9 DERMATITIS, UNSPECIFIED: Status: ACTIVE | Noted: 2024-02-09

## 2024-02-09 PROCEDURE — ? COUNSELING

## 2024-02-09 PROCEDURE — ? PRESCRIPTION

## 2024-02-09 PROCEDURE — 99213 OFFICE O/P EST LOW 20 MIN: CPT

## 2024-02-09 PROCEDURE — ? PRESCRIPTION MEDICATION MANAGEMENT

## 2024-02-09 PROCEDURE — ? RECOMMENDATIONS

## 2024-02-09 RX ORDER — METRONIDAZOLE 7.5 MG/G
CREAM TOPICAL
Qty: 45 | Refills: 6 | Status: ERX | COMMUNITY
Start: 2024-02-09

## 2024-02-09 RX ORDER — DOXYCYCLINE 100 MG/1
CAPSULE ORAL
Qty: 14 | Refills: 0 | Status: ERX | COMMUNITY
Start: 2024-02-09

## 2024-02-09 RX ADMIN — METRONIDAZOLE: 7.5 CREAM TOPICAL at 00:00

## 2024-02-09 RX ADMIN — DOXYCYCLINE: 100 CAPSULE ORAL at 00:00

## 2024-02-09 ASSESSMENT — LOCATION ZONE DERM: LOCATION ZONE: FACE

## 2024-02-09 ASSESSMENT — LOCATION DETAILED DESCRIPTION DERM: LOCATION DETAILED: LEFT CENTRAL MALAR CHEEK

## 2024-02-09 ASSESSMENT — LOCATION SIMPLE DESCRIPTION DERM: LOCATION SIMPLE: LEFT CHEEK

## 2024-02-09 NOTE — PROCEDURE: RECOMMENDATIONS
Recommendations (Free Text): Pt states that area started off small and has increased in redness and size.  Not painful or itchy
Detail Level: Zone
Render Risk Assessment In Note?: no

## 2024-02-09 NOTE — PROCEDURE: PRESCRIPTION MEDICATION MANAGEMENT
Detail Level: Zone
Render In Strict Bullet Format?: No
Initiate Treatment: Doxycycline 100mg for 2 weeks\\nMetronidazole cream nightly

## 2024-04-01 ENCOUNTER — APPOINTMENT (RX ONLY)
Dept: URBAN - METROPOLITAN AREA CLINIC 329 | Facility: CLINIC | Age: 44
Setting detail: DERMATOLOGY
End: 2024-04-01

## 2024-04-01 ENCOUNTER — RX ONLY (OUTPATIENT)
Age: 44
Setting detail: RX ONLY
End: 2024-04-01

## 2024-04-01 DIAGNOSIS — L71.8 OTHER ROSACEA: ICD-10-CM

## 2024-04-01 DIAGNOSIS — B95.61 METHICILLIN SUSCEPTIBLE STAPHYLOCOCCUS AUREUS INFECTION AS THE CAUSE OF DISEASES CLASSIFIED ELSEWHERE: ICD-10-CM | Status: INADEQUATELY CONTROLLED

## 2024-04-01 PROCEDURE — ? PRESCRIPTION

## 2024-04-01 PROCEDURE — 99214 OFFICE O/P EST MOD 30 MIN: CPT

## 2024-04-01 PROCEDURE — ? COUNSELING

## 2024-04-01 PROCEDURE — ? PRESCRIPTION MEDICATION MANAGEMENT

## 2024-04-01 RX ORDER — MUPIROCIN 20 MG/G
OINTMENT TOPICAL
Qty: 22 | Refills: 0 | Status: ERX | COMMUNITY
Start: 2024-04-01

## 2024-04-01 RX ORDER — METRONIDAZOLE 7.5 MG/G
CREAM TOPICAL
Qty: 45 | Refills: 6 | Status: ERX

## 2024-04-01 RX ADMIN — MUPIROCIN: 20 OINTMENT TOPICAL at 00:00

## 2024-04-01 NOTE — PROCEDURE: PRESCRIPTION MEDICATION MANAGEMENT
Render In Strict Bullet Format?: No
Continue Regimen: Metronidazole. Every night for 8 weeks then return to clinic. Sent refills
Detail Level: Zone
Initiate Treatment: Mupirocin

## 2024-08-26 ENCOUNTER — RX ONLY (OUTPATIENT)
Age: 44
Setting detail: RX ONLY
End: 2024-08-26

## 2024-08-26 RX ORDER — SELENIUM SULFIDE 23 MG/ML
SHAMPOO TOPICAL
Qty: 180 | Refills: 5 | Status: ERX | COMMUNITY
Start: 2024-08-26

## 2024-08-26 RX ORDER — CLOBETASOL PROPIONATE 0.5 MG/ML
SOLUTION TOPICAL
Qty: 50 | Refills: 2 | Status: ERX